# Patient Record
Sex: MALE | Race: WHITE | NOT HISPANIC OR LATINO | URBAN - METROPOLITAN AREA
[De-identification: names, ages, dates, MRNs, and addresses within clinical notes are randomized per-mention and may not be internally consistent; named-entity substitution may affect disease eponyms.]

---

## 2020-04-01 ENCOUNTER — EMERGENCY (EMERGENCY)
Facility: HOSPITAL | Age: 25
LOS: 0 days | Discharge: HOME | End: 2020-04-01
Attending: EMERGENCY MEDICINE | Admitting: EMERGENCY MEDICINE
Payer: COMMERCIAL

## 2020-04-01 VITALS
TEMPERATURE: 98 F | DIASTOLIC BLOOD PRESSURE: 63 MMHG | OXYGEN SATURATION: 100 % | SYSTOLIC BLOOD PRESSURE: 119 MMHG | HEART RATE: 70 BPM | RESPIRATION RATE: 18 BRPM

## 2020-04-01 VITALS
OXYGEN SATURATION: 100 % | SYSTOLIC BLOOD PRESSURE: 144 MMHG | DIASTOLIC BLOOD PRESSURE: 84 MMHG | WEIGHT: 149.91 LBS | TEMPERATURE: 98 F | RESPIRATION RATE: 18 BRPM | HEART RATE: 101 BPM | HEIGHT: 66 IN

## 2020-04-01 DIAGNOSIS — R13.10 DYSPHAGIA, UNSPECIFIED: ICD-10-CM

## 2020-04-01 DIAGNOSIS — Z91.010 ALLERGY TO PEANUTS: ICD-10-CM

## 2020-04-01 DIAGNOSIS — R06.02 SHORTNESS OF BREATH: ICD-10-CM

## 2020-04-01 DIAGNOSIS — J02.9 ACUTE PHARYNGITIS, UNSPECIFIED: ICD-10-CM

## 2020-04-01 LAB
ALBUMIN SERPL ELPH-MCNC: 4.7 G/DL — SIGNIFICANT CHANGE UP (ref 3.5–5.2)
ALP SERPL-CCNC: 62 U/L — SIGNIFICANT CHANGE UP (ref 30–115)
ALT FLD-CCNC: 26 U/L — SIGNIFICANT CHANGE UP (ref 0–41)
ANION GAP SERPL CALC-SCNC: 14 MMOL/L — SIGNIFICANT CHANGE UP (ref 7–14)
AST SERPL-CCNC: 22 U/L — SIGNIFICANT CHANGE UP (ref 0–41)
BASOPHILS # BLD AUTO: 0.06 K/UL — SIGNIFICANT CHANGE UP (ref 0–0.2)
BASOPHILS NFR BLD AUTO: 0.7 % — SIGNIFICANT CHANGE UP (ref 0–1)
BILIRUB SERPL-MCNC: 0.3 MG/DL — SIGNIFICANT CHANGE UP (ref 0.2–1.2)
BUN SERPL-MCNC: 15 MG/DL — SIGNIFICANT CHANGE UP (ref 10–20)
CALCIUM SERPL-MCNC: 9.7 MG/DL — SIGNIFICANT CHANGE UP (ref 8.5–10.1)
CHLORIDE SERPL-SCNC: 98 MMOL/L — SIGNIFICANT CHANGE UP (ref 98–110)
CO2 SERPL-SCNC: 27 MMOL/L — SIGNIFICANT CHANGE UP (ref 17–32)
CREAT SERPL-MCNC: 1.2 MG/DL — SIGNIFICANT CHANGE UP (ref 0.7–1.5)
EOSINOPHIL # BLD AUTO: 0.64 K/UL — SIGNIFICANT CHANGE UP (ref 0–0.7)
EOSINOPHIL NFR BLD AUTO: 7.2 % — SIGNIFICANT CHANGE UP (ref 0–8)
GLUCOSE SERPL-MCNC: 138 MG/DL — HIGH (ref 70–99)
HCT VFR BLD CALC: 45.8 % — SIGNIFICANT CHANGE UP (ref 42–52)
HGB BLD-MCNC: 15.3 G/DL — SIGNIFICANT CHANGE UP (ref 14–18)
IMM GRANULOCYTES NFR BLD AUTO: 0.4 % — HIGH (ref 0.1–0.3)
LYMPHOCYTES # BLD AUTO: 3.55 K/UL — HIGH (ref 1.2–3.4)
LYMPHOCYTES # BLD AUTO: 39.7 % — SIGNIFICANT CHANGE UP (ref 20.5–51.1)
MCHC RBC-ENTMCNC: 28 PG — SIGNIFICANT CHANGE UP (ref 27–31)
MCHC RBC-ENTMCNC: 33.4 G/DL — SIGNIFICANT CHANGE UP (ref 32–37)
MCV RBC AUTO: 83.7 FL — SIGNIFICANT CHANGE UP (ref 80–94)
MONOCYTES # BLD AUTO: 0.61 K/UL — HIGH (ref 0.1–0.6)
MONOCYTES NFR BLD AUTO: 6.8 % — SIGNIFICANT CHANGE UP (ref 1.7–9.3)
NEUTROPHILS # BLD AUTO: 4.05 K/UL — SIGNIFICANT CHANGE UP (ref 1.4–6.5)
NEUTROPHILS NFR BLD AUTO: 45.2 % — SIGNIFICANT CHANGE UP (ref 42.2–75.2)
NRBC # BLD: 0 /100 WBCS — SIGNIFICANT CHANGE UP (ref 0–0)
PLATELET # BLD AUTO: 307 K/UL — SIGNIFICANT CHANGE UP (ref 130–400)
POTASSIUM SERPL-MCNC: 3.5 MMOL/L — SIGNIFICANT CHANGE UP (ref 3.5–5)
POTASSIUM SERPL-SCNC: 3.5 MMOL/L — SIGNIFICANT CHANGE UP (ref 3.5–5)
PROT SERPL-MCNC: 8 G/DL — SIGNIFICANT CHANGE UP (ref 6–8)
RBC # BLD: 5.47 M/UL — SIGNIFICANT CHANGE UP (ref 4.7–6.1)
RBC # FLD: 12.6 % — SIGNIFICANT CHANGE UP (ref 11.5–14.5)
SODIUM SERPL-SCNC: 139 MMOL/L — SIGNIFICANT CHANGE UP (ref 135–146)
WBC # BLD: 8.95 K/UL — SIGNIFICANT CHANGE UP (ref 4.8–10.8)
WBC # FLD AUTO: 8.95 K/UL — SIGNIFICANT CHANGE UP (ref 4.8–10.8)

## 2020-04-01 PROCEDURE — 99284 EMERGENCY DEPT VISIT MOD MDM: CPT

## 2020-04-01 PROCEDURE — 99053 MED SERV 10PM-8AM 24 HR FAC: CPT

## 2020-04-01 PROCEDURE — 71045 X-RAY EXAM CHEST 1 VIEW: CPT | Mod: 26

## 2020-04-01 RX ORDER — DEXAMETHASONE 0.5 MG/5ML
10 ELIXIR ORAL ONCE
Refills: 0 | Status: COMPLETED | OUTPATIENT
Start: 2020-04-01 | End: 2020-04-01

## 2020-04-01 RX ORDER — SODIUM CHLORIDE 9 MG/ML
1000 INJECTION INTRAMUSCULAR; INTRAVENOUS; SUBCUTANEOUS ONCE
Refills: 0 | Status: COMPLETED | OUTPATIENT
Start: 2020-04-01 | End: 2020-04-01

## 2020-04-01 RX ORDER — DIPHENHYDRAMINE HCL 50 MG
50 CAPSULE ORAL ONCE
Refills: 0 | Status: COMPLETED | OUTPATIENT
Start: 2020-04-01 | End: 2020-04-01

## 2020-04-01 RX ORDER — EPINEPHRINE 0.3 MG/.3ML
0.1 INJECTION INTRAMUSCULAR; SUBCUTANEOUS
Qty: 0.1 | Refills: 1
Start: 2020-04-01

## 2020-04-01 RX ADMIN — Medication 10 MILLIGRAM(S): at 08:16

## 2020-04-01 RX ADMIN — Medication 50 MILLIGRAM(S): at 08:16

## 2020-04-01 RX ADMIN — SODIUM CHLORIDE 2000 MILLILITER(S): 9 INJECTION INTRAMUSCULAR; INTRAVENOUS; SUBCUTANEOUS at 08:15

## 2020-04-01 NOTE — ED PROVIDER NOTE - NSFOLLOWUPINSTRUCTIONS_ED_ALL_ED_FT
Pharyngitis    Pharyngitis is inflammation of your pharynx, which is typically caused by a viral or bacterial infection. Pharyngitis can be contagious and may spread from person to person through intimate contact, coughing, sneezing, or sharing personal items and utensils. Symptoms of pharyngitis may include sore throat, fever, headache, or swollen lymph nodes. If you are prescribed antibiotics, make sure you finish them even if you start to feel better. Gargle with salt water as often as every 1-2 hours to soothe your throat. Throat lozenges (if you are not at risk for choking) or sprays may be used to soothe your throat.    SEEK IMMEDIATE MEDICAL CARE IF YOU HAVE ANY OF THE FOLLOWING SYMPTOMS: neck stiffness, drooling, hoarseness or change in voice, inability to swallow liquids, vomiting, or trouble breathing.    You are being discharged with viral illness diagnosis and do not require hospitalization.  At this time, only patients who are being hospitalized are tested for COVID-19.    If you are well enough to be discharged home and are not in a high risk group to be admitted, you should care for yourself at home exactly like you would if you have Influenza “flu”. Follow all the standard guidelines about washing your hands, covering your cough, etc. If you feel unwell, stay home, rest and drink plenty of clear fluids. Keep track of your symptoms. You should return to the Emergency Department if you develop worse symptoms, trouble breathing, chest pain, and/or a fever that doesn’t improve with over the counter medications.    Please consider going through the drive-through testing unless you are severely ill and need to go to the ED.  -through testing is available at various location, including [place].  Call [facility] at  [phone] to make an appointment.    How to Set Up Your Home for Self-Quarantine or Self-Isolation    Please refer to this helpful video.   [email or web address]

## 2020-04-01 NOTE — ED PROVIDER NOTE - NS ED ROS FT
CONSTITUTIONAL: (-) fevers, (-) chills  ENT: see HPI, (-) congestion, (-) rhinorrhea, (-) post nasal drip, (-) facial swelling  CARDIO: (-) chest pain, (-) palpitations, (-) edema  PULM: (-) cough, (-) sputum, (+) shortness of breath, (-) wheezing, (-) hemoptysis, (-) stridor  GI: (-) nausea, (-) vomiting, (-) diarrhea, (-) abdominal pain  SKIN: (-) rashes, (-) itching, (-) pallor  NEURO: (-) headache, (-) dizziness, (-) lightheadedness, (-) syncope    *all other systems negative except as documented above and in the HPI*

## 2020-04-01 NOTE — ED PROVIDER NOTE - PATIENT PORTAL LINK FT
You can access the FollowMyHealth Patient Portal offered by [facility] by registering at the following website: [email or web address]. By joining [employer]’s FollowMyHealth portal, you will also be able to view your health information using other applications (apps) compatible with our system.

## 2020-04-01 NOTE — ED PROVIDER NOTE - OBJECTIVE STATEMENT
24 year old male w hx of peanut allergy presents to the ED for evaluation of difficulty swallowing. Pt states he went to bed in his normal state of health, woke up around 1 AM because he could not sleep. Around 5:30 AM noticed throat itching and soreness, was concerned he was having an allergic reaction so he took a po antihistamine (pt unsure which one). Pt then began to feel short of breath, prompting ED evaluation. Pt denies being in contact with peanuts, denies other allergies. Did not use his epi pen for his symptoms. Denies fevers/chills, recent travel/sick contacts, wheezing, stridor, drooling, voice change, chest pain, abd pain, nausea, vomiting, diarrhea, rash. 24 year old male w hx of peanut allergy presents to the ED for evaluation of gradual onset, mild difficulty swallowing since this morning. Pt states he went to bed in his normal state of health, woke up around 1 AM because he could not sleep. Around 5:30 AM noticed throat itching and soreness, was concerned he was having an allergic reaction so he took a po antihistamine (pt unsure which one). Pt then began to feel short of breath, prompting ED evaluation. Pt denies being in contact with peanuts, denies other allergies. Did not use his epi pen for his symptoms. Denies fevers/chills, recent travel/sick contacts, wheezing, stridor, drooling, voice change, chest pain, abd pain, nausea, vomiting, diarrhea, rash.

## 2020-04-01 NOTE — ED PROVIDER NOTE - PROGRESS NOTE DETAILS
AUDREY: patient resting comfortably at this time, reports improvement in symptoms after benadryl and decadron, VS improved. discussed cxr results. lab results pending. Will continue to monitor. AUDREY: Patient reports improvement in symptoms. No progression in symptoms during ED stay, vital signs stable. Results discussed with patient, printed copies given. Patient agreeable and verbalizes understanding of plan of care, f/u, and return precautions.

## 2020-04-01 NOTE — ED PROVIDER NOTE - PHYSICAL EXAMINATION
VITALS:  I have reviewed the initial vital signs.  GENERAL: Well-developed, well-nourished, in no acute distress. Nontoxic.  HEENT: Sclera clear. No conjunctival injection. EOMI, PERRLA. Normal voice, tolerating oral secretions. Mucous membranes moist, oropharynx nonerythematous without swelling or lesions. Tonsils 3+ bilaterally and w/o exudate. Uvula midline and without edema. Nasal turbinates clear and w/o discharge.   NECK: supple w FROM.   CARDIO: RRR, nl S1 and S2. No murmurs, rubs, or gallops. 2+ radial pulses bilaterally.   PULM: Normal effort. No tachypnea or retractions. CTA b/l without wheezes, rales, or rhonchi.  MSK: FROM to extremities x4.  GI: Abdomen soft and non-distended. Nontender.  SKIN: Warm, dry. No pallor or rashes. Capillary refill <2 seconds.  NEURO: A&Ox3. Speech clear. No gross motor/sensory deficits.  PSYCH: Calm and cooperative.

## 2020-04-05 ENCOUNTER — EMERGENCY (EMERGENCY)
Facility: HOSPITAL | Age: 25
LOS: 0 days | Discharge: HOME | End: 2020-04-05
Attending: EMERGENCY MEDICINE | Admitting: EMERGENCY MEDICINE
Payer: COMMERCIAL

## 2020-04-05 VITALS
DIASTOLIC BLOOD PRESSURE: 70 MMHG | RESPIRATION RATE: 16 BRPM | HEART RATE: 77 BPM | TEMPERATURE: 98 F | SYSTOLIC BLOOD PRESSURE: 115 MMHG | OXYGEN SATURATION: 97 %

## 2020-04-05 VITALS
DIASTOLIC BLOOD PRESSURE: 73 MMHG | OXYGEN SATURATION: 99 % | SYSTOLIC BLOOD PRESSURE: 125 MMHG | HEART RATE: 85 BPM | HEIGHT: 66 IN | RESPIRATION RATE: 18 BRPM | WEIGHT: 149.91 LBS | TEMPERATURE: 98 F

## 2020-04-05 DIAGNOSIS — R00.2 PALPITATIONS: ICD-10-CM

## 2020-04-05 DIAGNOSIS — J02.9 ACUTE PHARYNGITIS, UNSPECIFIED: ICD-10-CM

## 2020-04-05 DIAGNOSIS — Z91.010 ALLERGY TO PEANUTS: ICD-10-CM

## 2020-04-05 DIAGNOSIS — R07.89 OTHER CHEST PAIN: ICD-10-CM

## 2020-04-05 DIAGNOSIS — R06.02 SHORTNESS OF BREATH: ICD-10-CM

## 2020-04-05 PROBLEM — Z78.9 OTHER SPECIFIED HEALTH STATUS: Chronic | Status: ACTIVE | Noted: 2020-04-01

## 2020-04-05 PROCEDURE — 99284 EMERGENCY DEPT VISIT MOD MDM: CPT

## 2020-04-05 RX ORDER — DEXAMETHASONE 0.5 MG/5ML
10 ELIXIR ORAL ONCE
Refills: 0 | Status: COMPLETED | OUTPATIENT
Start: 2020-04-05 | End: 2020-04-05

## 2020-04-05 RX ADMIN — Medication 10 MILLIGRAM(S): at 12:59

## 2020-04-05 NOTE — ED PROVIDER NOTE - PROVIDER TOKENS
FREE:[LAST:[your PMD],PHONE:[(   )    -],FAX:[(   )    -],FOLLOWUP:[1-3 Days]],PROVIDER:[TOKEN:[[id number]],FOLLOWUP:[1-3 Days]]

## 2020-04-05 NOTE — ED ADULT NURSE NOTE - PAIN: PRESENCE, MLM
denies pain/discomfort [Maximal Pain Intensity: 0/10] : 0/10 [Least Pain Intensity: 0/10] : 0/10 [NoTreatment Scheduled] : no treatment scheduled [Date: ____________] : Patient's last distress assessment performed on [unfilled]. [4 - Distress Level] : Distress Level: 4 [80: Normal activity with effort; some signs or symptoms of disease.] : 80: Normal activity with effort; some signs or symptoms of disease.  [ECOG Performance Status: 1 - Restricted in physically strenuous activity but ambulatory and able to carry out work of a light or sedentary nature] : Performance Status: 1 - Restricted in physically strenuous activity but ambulatory and able to carry out work of a light or sedentary nature, e.g., light house work, office work [FreeTextEntry7] : Transportation concerns.

## 2020-04-05 NOTE — ED PROVIDER NOTE - PHYSICAL EXAMINATION
Gen: Alert, NAD, well appearing  Head: NC, AT, PERRL, EOMI, normal lids/conjunctiva  ENT: normal hearing, patent oropharynx with erythema and enlarged tonsils b/l  Neck: +supple, no tenderness/meningismus,  Pulm: Bilateral BS, normal resp effort, no wheeze/stridor/retractions  CV: RRR, no murmer  Abd: soft, NT/ND  Mskel: no edema/erythema/cyanosis  Skin: no rash, warm/dry  Neuro: AAOx3, no sensory/motor deficits

## 2020-04-05 NOTE — ED PROVIDER NOTE - OBJECTIVE STATEMENT
23 yo M c/o feeling heart racing, SOB, and chest tightness x 5 days. He was seen in ED for same 5 days ago and was given Amoxicillin for throat infection. Patient with same symptoms yesterday so he went to [facility] and abx was switched to Zithromax. Patient with same symptoms again today so came to ED.

## 2020-04-05 NOTE — ED PROVIDER NOTE - PROGRESS NOTE DETAILS
ATTENDING NOTE: 23 y/o M presents for evaluation of palpitations. Pt was recently diagnosed with pharyngitis and placed on Amoxicillin. Pt’s sxs persisted so he went to [facility] and was given 1 dose of Azithromycin, to which he felt jittery and palpitations and was sent to ED for eval. Pt has no change in speech, trouble breathing or CP. CON: ao x 3, HENMT: (+) tonsillar enlargement b/l, no exudates, uvula midline,  neck supple,  CV: rrr, equal pulses b/l, RESP: cta b/l, GI:  soft, nontender, no rebound, no guarding, SKIN: no rash, MSK: no deformities, NEURO: no gross motor or sensory deficit Psychiatric: appropriate mood, appropriate affect. Imp – palpitations after medication. Azithromycin unlikely to help with pharyngitis. Advised to continue Amoxicillin. Will give dose of Decadron.

## 2020-04-05 NOTE — ED PROVIDER NOTE - CLINICAL SUMMARY MEDICAL DECISION MAKING FREE TEXT BOX
pt with palpitations after medication. Azithromycin unlikely to help with pharyngitis. Advised to continue Amoxicillin. Will give dose of Decadron. ekg unremarkable. dc home

## 2020-04-05 NOTE — ED PROVIDER NOTE - PATIENT PORTAL LINK FT
You can access the FollowMyHealth Patient Portal offered by [facility] by registering at the following website: [email or web address]. By joining [employer]’s FollowMyHealth portal, you will also be able to view your health information using other applications (apps) compatible with our system.

## 2020-04-05 NOTE — ED PROVIDER NOTE - NSFOLLOWUPINSTRUCTIONS_ED_ALL_ED_FT
Palpitations    A palpitation is the feeling that your heartbeat is irregular or is faster than normal. It may feel like your heart is fluttering or skipping a beat. They may be caused by many things, including smoking, caffeine, alcohol, stress, and certain medicines. Although most causes of palpitations are not serious, palpitations can be a sign of a serious medical problem. Avoid caffeine, alcohol, tobacco products at home. Try to reduce stress and anxiety, and make sure to get plenty of rest.     SEEK IMMEDIATE MEDICAL CARE IF YOU HAVE THE FOLLOWING SYMPTOMS: chest pain, shortness of breath, severe headache, or dizziness/lightheadedness.    Pharyngitis    Pharyngitis is redness, pain, and swelling (inflammation) of your pharynx. Pharyngitis is usually caused by an infection which can be viral or bacterial in origin. Pharyngitis can be contagious and may spread from person to person through intimate contact, coughing, sneezing, or sharing personal items and utensils. Symptoms of pharyngitis may include sore throat, fever, headache, or swollen lymph nodes. If you are prescribed antibiotics, make sure you finish them even if you start to feel better. Gargle with 8 oz of salt water (½ tsp of salt per 1 qt of water) as often as every 1–2 hours to soothe your throat. Throat lozenges (if you are not at risk for choking) or sprays may be used to soothe your throat.    SEEK IMMEDIATE MEDICAL CARE IF YOU HAVE THE FOLLOWING SYMPTOMS: neck stiffness, drooling, inability to swallow liquids, vomiting and inability to keep medicine/liquid down, or trouble breathing,

## 2020-04-05 NOTE — ED PROVIDER NOTE - CARE PROVIDER_API CALL
your PMD,   Phone: (   )    -  Fax: (   )    -  Follow Up Time: 1-3 Days    Roel Contreras)  Cardiovascular Disease; Internal Medicine  [address] [place] [id number]  Phone: [phone]  Fax: [phone]  Follow Up Time: 1-3 Days

## 2020-04-05 NOTE — ED PROVIDER NOTE - NS ED ROS FT
Review of Systems    Constitutional: (-) fever, (-) chills  Eyes/ENT: (-) blurry vision, (-) epistaxis, (-) sore throat  Cardiovascular: (+) chest tightness, (+) palpitation, (-) syncope  Respiratory: (-) cough, (+) shortness of breath  Gastrointestinal: (-) pain, (-) nausea, (-) vomiting, (-) diarrhea  Musculoskeletal: (-) neck pain, (-) back pain, (-) body aches  Integumentary: (-) rash, (-) edema  Neurological: (-) headache, (-) altered mental status  Psychiatric: (-) hallucinations  Allergic/Immunologic: (-) pruritus

## 2021-04-27 NOTE — ED ADULT TRIAGE NOTE - BP NONINVASIVE SYSTOLIC (MM HG)
125 Clindamycin Counseling: I counseled the patient regarding use of clindamycin as an antibiotic for prophylactic and/or therapeutic purposes. Clindamycin is active against numerous classes of bacteria, including skin bacteria. Side effects may include nausea, diarrhea, gastrointestinal upset, rash, hives, yeast infections, and in rare cases, colitis.

## 2022-03-19 NOTE — ED PROVIDER NOTE - ATTENDING CONTRIBUTION TO CARE
Problem: Coping:  Goal: Ability to verbalize feelings will improve  Description: Ability to verbalize feelings will improve  Outcome: Met This Shift  Goal: Level of anxiety will decrease  Description: Level of anxiety will decrease  3/19/2022 1019 by Madelin Pearson RN  Outcome: Met This Shift  3/18/2022 2306 by Josr Horner RN  Outcome: Ongoing
Problem: Coping:  Goal: Level of anxiety will decrease  Description: Level of anxiety will decrease  Outcome: Ongoing     Problem: Fluid Volume:  Goal: Will maintain adequate fluid volume  Description: Will maintain adequate fluid volume  Outcome: Ongoing     Problem: Physical Regulation:  Goal: Hemodynamic stability will improve  Description: Hemodynamic stability will improve  Outcome: Ongoing     Problem: Respiratory:  Goal: Ability to achieve and maintain a regular respiratory rate will improve  Description: Ability to achieve and maintain a regular respiratory rate will improve  Outcome: Ongoing
I was present for and supervised the key and critical aspects of the procedures performed during the care of the patient. Patient presents for evaluation of sob, starting overnight with no cp or fevers or chills he has improved at this time.  he has no lip swelling he is not drooling.  On physical exam he is nc/at perrla from of neck oropharynx reveals b/l tonsilar swelling with mild erythema no signs of abscess at this time cta b/l, rrr s1s2 noted abd-soft nt ndbs+e xt from with no signs of rash  a/p patients pe is more consistent with infectious cause than allergic reaction I will dc patient wirh oral antibiotics and rx for epipen